# Patient Record
Sex: MALE | Race: WHITE | NOT HISPANIC OR LATINO | Employment: FULL TIME | URBAN - METROPOLITAN AREA
[De-identification: names, ages, dates, MRNs, and addresses within clinical notes are randomized per-mention and may not be internally consistent; named-entity substitution may affect disease eponyms.]

---

## 2021-07-25 ENCOUNTER — APPOINTMENT (OUTPATIENT)
Dept: RADIOLOGY | Facility: MEDICAL CENTER | Age: 36
End: 2021-07-25
Attending: EMERGENCY MEDICINE
Payer: COMMERCIAL

## 2021-07-25 ENCOUNTER — HOSPITAL ENCOUNTER (EMERGENCY)
Facility: MEDICAL CENTER | Age: 36
End: 2021-07-25
Attending: EMERGENCY MEDICINE
Payer: COMMERCIAL

## 2021-07-25 VITALS
RESPIRATION RATE: 17 BRPM | HEART RATE: 72 BPM | HEIGHT: 70 IN | BODY MASS INDEX: 23.67 KG/M2 | DIASTOLIC BLOOD PRESSURE: 77 MMHG | SYSTOLIC BLOOD PRESSURE: 110 MMHG | OXYGEN SATURATION: 97 % | TEMPERATURE: 98.3 F | WEIGHT: 165.34 LBS

## 2021-07-25 DIAGNOSIS — M25.512 CHRONIC LEFT SHOULDER PAIN: ICD-10-CM

## 2021-07-25 DIAGNOSIS — G89.29 CHRONIC LEFT SHOULDER PAIN: ICD-10-CM

## 2021-07-25 DIAGNOSIS — R07.89 CHEST WALL PAIN: ICD-10-CM

## 2021-07-25 LAB
ALBUMIN SERPL BCP-MCNC: 4.3 G/DL (ref 3.2–4.9)
ALBUMIN/GLOB SERPL: 2 G/DL
ALP SERPL-CCNC: 49 U/L (ref 30–99)
ALT SERPL-CCNC: 23 U/L (ref 2–50)
ANION GAP SERPL CALC-SCNC: 9 MMOL/L (ref 7–16)
AST SERPL-CCNC: 20 U/L (ref 12–45)
BASOPHILS # BLD AUTO: 0.6 % (ref 0–1.8)
BASOPHILS # BLD: 0.05 K/UL (ref 0–0.12)
BILIRUB SERPL-MCNC: 0.3 MG/DL (ref 0.1–1.5)
BUN SERPL-MCNC: 15 MG/DL (ref 8–22)
CALCIUM SERPL-MCNC: 8.6 MG/DL (ref 8.5–10.5)
CHLORIDE SERPL-SCNC: 108 MMOL/L (ref 96–112)
CO2 SERPL-SCNC: 23 MMOL/L (ref 20–33)
CREAT SERPL-MCNC: 0.89 MG/DL (ref 0.5–1.4)
D DIMER PPP IA.FEU-MCNC: <0.27 UG/ML (FEU) (ref 0–0.5)
EKG IMPRESSION: NORMAL
EOSINOPHIL # BLD AUTO: 0.27 K/UL (ref 0–0.51)
EOSINOPHIL NFR BLD: 3.3 % (ref 0–6.9)
ERYTHROCYTE [DISTWIDTH] IN BLOOD BY AUTOMATED COUNT: 42.1 FL (ref 35.9–50)
GLOBULIN SER CALC-MCNC: 2.1 G/DL (ref 1.9–3.5)
GLUCOSE SERPL-MCNC: 103 MG/DL (ref 65–99)
HCT VFR BLD AUTO: 42.1 % (ref 42–52)
HGB BLD-MCNC: 14.4 G/DL (ref 14–18)
IMM GRANULOCYTES # BLD AUTO: 0.05 K/UL (ref 0–0.11)
IMM GRANULOCYTES NFR BLD AUTO: 0.6 % (ref 0–0.9)
LYMPHOCYTES # BLD AUTO: 1.41 K/UL (ref 1–4.8)
LYMPHOCYTES NFR BLD: 17.5 % (ref 22–41)
MCH RBC QN AUTO: 31.7 PG (ref 27–33)
MCHC RBC AUTO-ENTMCNC: 34.2 G/DL (ref 33.7–35.3)
MCV RBC AUTO: 92.7 FL (ref 81.4–97.8)
MONOCYTES # BLD AUTO: 0.63 K/UL (ref 0–0.85)
MONOCYTES NFR BLD AUTO: 7.8 % (ref 0–13.4)
NEUTROPHILS # BLD AUTO: 5.65 K/UL (ref 1.82–7.42)
NEUTROPHILS NFR BLD: 70.2 % (ref 44–72)
NRBC # BLD AUTO: 0 K/UL
NRBC BLD-RTO: 0 /100 WBC
PLATELET # BLD AUTO: 230 K/UL (ref 164–446)
PMV BLD AUTO: 8.8 FL (ref 9–12.9)
POTASSIUM SERPL-SCNC: 4.1 MMOL/L (ref 3.6–5.5)
PROT SERPL-MCNC: 6.4 G/DL (ref 6–8.2)
RBC # BLD AUTO: 4.54 M/UL (ref 4.7–6.1)
SODIUM SERPL-SCNC: 140 MMOL/L (ref 135–145)
TROPONIN T SERPL-MCNC: 11 NG/L (ref 6–19)
WBC # BLD AUTO: 8.1 K/UL (ref 4.8–10.8)

## 2021-07-25 PROCEDURE — 73030 X-RAY EXAM OF SHOULDER: CPT | Mod: LT

## 2021-07-25 PROCEDURE — 84484 ASSAY OF TROPONIN QUANT: CPT

## 2021-07-25 PROCEDURE — 96374 THER/PROPH/DIAG INJ IV PUSH: CPT

## 2021-07-25 PROCEDURE — 80053 COMPREHEN METABOLIC PANEL: CPT

## 2021-07-25 PROCEDURE — 700111 HCHG RX REV CODE 636 W/ 250 OVERRIDE (IP): Performed by: EMERGENCY MEDICINE

## 2021-07-25 PROCEDURE — 85379 FIBRIN DEGRADATION QUANT: CPT

## 2021-07-25 PROCEDURE — 93005 ELECTROCARDIOGRAM TRACING: CPT | Performed by: EMERGENCY MEDICINE

## 2021-07-25 PROCEDURE — 71045 X-RAY EXAM CHEST 1 VIEW: CPT

## 2021-07-25 PROCEDURE — 85025 COMPLETE CBC W/AUTO DIFF WBC: CPT

## 2021-07-25 PROCEDURE — 99284 EMERGENCY DEPT VISIT MOD MDM: CPT

## 2021-07-25 RX ORDER — KETOROLAC TROMETHAMINE 30 MG/ML
30 INJECTION, SOLUTION INTRAMUSCULAR; INTRAVENOUS ONCE
Status: COMPLETED | OUTPATIENT
Start: 2021-07-25 | End: 2021-07-25

## 2021-07-25 RX ORDER — METHYLPREDNISOLONE 4 MG/1
TABLET ORAL
Qty: 21 EACH | Refills: 0 | Status: SHIPPED | OUTPATIENT
Start: 2021-07-25 | End: 2021-08-10

## 2021-07-25 RX ADMIN — KETOROLAC TROMETHAMINE 30 MG: 30 INJECTION, SOLUTION INTRAMUSCULAR at 12:14

## 2021-07-25 ASSESSMENT — LIFESTYLE VARIABLES
TOTAL SCORE: 0
TOTAL SCORE: 0
HOW MANY TIMES IN THE PAST YEAR HAVE YOU HAD 5 OR MORE DRINKS IN A DAY: 0
EVER HAD A DRINK FIRST THING IN THE MORNING TO STEADY YOUR NERVES TO GET RID OF A HANGOVER: NO
HAVE PEOPLE ANNOYED YOU BY CRITICIZING YOUR DRINKING: NO
AVERAGE NUMBER OF DAYS PER WEEK YOU HAVE A DRINK CONTAINING ALCOHOL: 0
TOTAL SCORE: 0
CONSUMPTION TOTAL: NEGATIVE
EVER FELT BAD OR GUILTY ABOUT YOUR DRINKING: NO
HAVE YOU EVER FELT YOU SHOULD CUT DOWN ON YOUR DRINKING: NO
DO YOU DRINK ALCOHOL: NO
ON A TYPICAL DAY WHEN YOU DRINK ALCOHOL HOW MANY DRINKS DO YOU HAVE: 0

## 2021-07-25 ASSESSMENT — HEART SCORE
RISK FACTORS: NO KNOWN RISK FACTORS
ECG: NORMAL
AGE: <45
HISTORY: SLIGHTLY SUSPICIOUS
TROPONIN: LESS THAN OR EQUAL TO NORMAL LIMIT
HEART SCORE: 0

## 2021-07-25 NOTE — ED NOTES
Pt ambulated to green pod without distress, VSS on RA, gCS 15, denies medical hx. Pt states pain in L axilla X 2 months immediately after J+J covid vaccine in LUE, pain intermittent in intensity but worse today, pain is relieved by heat and position (better when elevated), pt denies any other chest pain or SOB. Pt states a numbness / tingling that radiates down LUE by position.

## 2021-07-25 NOTE — ED PROVIDER NOTES
ED Provider Note    Scribed for Paty Johnson M.D. by Varsha Brooks. 7/25/2021  10:44 AM    Primary care provider: Pcp Pt States None  Means of arrival: Walk in  History obtained from: patient  History limited by: None    CHIEF COMPLAINT  Chief Complaint   Patient presents with    Chest Wall Pain    Tingling       HPI  Maycol Lezama is a 36 y.o. male who presents to the Emergency Department with constant mild chest wall pain onset 6 weeks ago. He describes the pain as most prominent around the left axilla and shoulder with radiation down his back, and accompanied by numbness and tingling in his left arm. Per the patient, the pain appeared to start after he received the J&J vaccine, and has since persisted at around 2/10 pain. He states that he signed up to go to the gym as he initially attributed the pain to lack of fitness, and performed stretches and light weight lifting. He came in today as the pain worsened substantially around 3 AM from 2/10 pain to 8/10 sharp pain that was not relieved by anything. The patient denies palpitations, shortness of breath, nausea, vomiting, diarrhea, constipation, headache, double vision, fevers, chills, or rashes. Stretching and massages provided mild alleviation and moving forward and sudden movements exacerbate the pain.  He denies major medical history, taking daily medications, past surgeries, and history of pain or injury to his left arm. He vapes, used to smoke cigarettes, occasionally smokes marijuana, and has 2-3 beers a week.    REVIEW OF SYSTEMS  EYES: no vision changes  CARDIAC: no palpitations    PULMONARY: no shortness of breath  GI: no vomiting, nausea, diarrhea, or constipation.   Neuro: no headache  Musculoskeletal: left chest wall pain, most prominent around the left axilla and shoulder. Numbness and tingling in the left arm  Endocrine: no fevers or chills   SKIN: no rash    See history of present illness. All other systems are negative. C.    PAST MEDICAL  "HISTORY   has a past medical history of Eczema.    SURGICAL HISTORY  patient denies any surgical history    SOCIAL HISTORY  Social History     Tobacco Use    Smoking status: Current Every Day Smoker    Smokeless tobacco: Never Used   Substance Use Topics    Alcohol use: 2-3 beers a week    Drug use: Occasional marijuana use (smokes      Social History     Substance and Sexual Activity   Drug Use Occasional marijuana use       FAMILY HISTORY  History reviewed. No pertinent family history.    CURRENT MEDICATIONS  Home Medications       Reviewed by Chely Alexander R.N. (Registered Nurse) on 07/25/21 at 1017  Med List Status: Partial     Medication Last Dose Status   diphenhydrAMINE (BENADRYL) 25 MG TABS  Active   fluocinonide (LIDEX) 0.05 % CREA  Active   loratadine (CLARITIN) 10 MG TABS  Active   methylPREDNISolone (MEDROL DOSEPACK) 4 MG tablet  Active   methylPREDNISolone (MEDROL DOSEPACK) 4 MG tablet  Active   sulfamethoxazole-trimethoprim (BACTRIM DS) 800-160 MG tablet  Active   tobramycin-dexamethasone (TOBRADEX) 0.3-0.1 % SUSP  Active   triamcinolone acetonide (KENALOG) 0.1 % OINT  Active                    ALLERGIES  No Known Allergies    PHYSICAL EXAM  VITAL SIGNS: /78   Pulse 75   Temp 36.8 °C (98.3 °F) (Temporal)   Resp 16   Ht 1.778 m (5' 10\")   Wt 75 kg (165 lb 5.5 oz)   SpO2 96%   BMI 23.72 kg/m²     Constitutional: Well developed, Well nourished, No acute distress, Non-toxic appearance.   HEENT: Normocephalic, Atraumatic,  external ears normal, pharynx pink,  Mucous  Membranes moist, No rhinorrhea or mucosal edema  Eyes: PERRL, EOMI, Conjunctiva normal, No discharge.   Neck: Normal range of motion, No tenderness, Supple, No stridor.   Lymphatic: No lymphadenopathy    Cardiovascular: Regular Rate and Rhythm, No murmurs,  rubs, or gallops.   Thorax & Lungs: Lungs clear to auscultation bilaterally, No respiratory distress, No wheezes, rhales or rhonchi, No chest wall tenderness.   Abdomen: " Bowel sounds normal, Soft, non tender, non distended,  No pulsatile masses., no rebound guarding or peritoneal signs.   Skin: Warm, Dry, No erythema, No rash,   Back:  No CVA tenderness,  No spinal tenderness, bony crepitance, step offs, or instability.   Neurologic: Alert & oriented clear speech no focal deficits  Extremities: Equal, intact distal pulses, No cyanosis, clubbing or edema. 5/5 strength in bilateral upper extremities  Musculoskeletal: Good range of motion in all major joints. Tenderness in the left scapular area. No major deformities noted.     DIAGNOSTIC STUDIES / PROCEDURES    LABS  Results for orders placed or performed during the hospital encounter of 07/25/21   CBC with Differential   Result Value Ref Range    WBC 8.1 4.8 - 10.8 K/uL    RBC 4.54 (L) 4.70 - 6.10 M/uL    Hemoglobin 14.4 14.0 - 18.0 g/dL    Hematocrit 42.1 42.0 - 52.0 %    MCV 92.7 81.4 - 97.8 fL    MCH 31.7 27.0 - 33.0 pg    MCHC 34.2 33.7 - 35.3 g/dL    RDW 42.1 35.9 - 50.0 fL    Platelet Count 230 164 - 446 K/uL    MPV 8.8 (L) 9.0 - 12.9 fL    Neutrophils-Polys 70.20 44.00 - 72.00 %    Lymphocytes 17.50 (L) 22.00 - 41.00 %    Monocytes 7.80 0.00 - 13.40 %    Eosinophils 3.30 0.00 - 6.90 %    Basophils 0.60 0.00 - 1.80 %    Immature Granulocytes 0.60 0.00 - 0.90 %    Nucleated RBC 0.00 /100 WBC    Neutrophils (Absolute) 5.65 1.82 - 7.42 K/uL    Lymphs (Absolute) 1.41 1.00 - 4.80 K/uL    Monos (Absolute) 0.63 0.00 - 0.85 K/uL    Eos (Absolute) 0.27 0.00 - 0.51 K/uL    Baso (Absolute) 0.05 0.00 - 0.12 K/uL    Immature Granulocytes (abs) 0.05 0.00 - 0.11 K/uL    NRBC (Absolute) 0.00 K/uL   Complete Metabolic Panel (CMP)   Result Value Ref Range    Sodium 140 135 - 145 mmol/L    Potassium 4.1 3.6 - 5.5 mmol/L    Chloride 108 96 - 112 mmol/L    Co2 23 20 - 33 mmol/L    Anion Gap 9.0 7.0 - 16.0    Glucose 103 (H) 65 - 99 mg/dL    Bun 15 8 - 22 mg/dL    Creatinine 0.89 0.50 - 1.40 mg/dL    Calcium 8.6 8.5 - 10.5 mg/dL    AST(SGOT) 20 12 -  45 U/L    ALT(SGPT) 23 2 - 50 U/L    Alkaline Phosphatase 49 30 - 99 U/L    Total Bilirubin 0.3 0.1 - 1.5 mg/dL    Albumin 4.3 3.2 - 4.9 g/dL    Total Protein 6.4 6.0 - 8.2 g/dL    Globulin 2.1 1.9 - 3.5 g/dL    A-G Ratio 2.0 g/dL   Troponin STAT   Result Value Ref Range    Troponin T 11 6 - 19 ng/L   D-Dimer   Result Value Ref Range    D-Dimer Screen <0.27 0.00 - 0.50 ug/mL (FEU)   ESTIMATED GFR   Result Value Ref Range    GFR If African American >60 >60 mL/min/1.73 m 2    GFR If Non African American >60 >60 mL/min/1.73 m 2   EKG   Result Value Ref Range    Report       [facility] Emergency Dept.    Test Date:  2021  Pt Name:    KAVYA LLAMAS             Department: ER  MRN:        [id number]                      Room:       [facility]  Gender:     Male                         Technician: [id number]  :        1985                   Requested By:BIMAL GABRIEL  Order #:    [id number]                    Reading MD: BIMAL GABRIEL MD    Measurements  Intervals                                Axis  Rate:       63                           P:          23  NV:         172                          QRS:        60  QRSD:       106                          T:          40  QT:         403  QTc:        413    Interpretive Statements  Sinus rhythm  No previous ECG available for comparison  Electronically Signed On 2021 12:24:36 PDT by BIMAL GABRIEL MD       All labs reviewed by me.    EKG  12 lead EKG; Interpreted by emergency department physician as above    RADIOLOGY  DX-SHOULDER 2+ LEFT   Final Result      No acute fracture or significant arthropathy.      DX-CHEST-PORTABLE (1 VIEW)   Final Result      No evidence of acute cardiopulmonary process.        The radiologist's interpretation of all radiological studies have been reviewed by me.    COURSE & MEDICAL DECISION MAKING  Nursing notes, VS, PMSFHx reviewed in chart.    10:44 AM Patient seen and examined at bedside. Ordered DX-shoulder, DX-chest,  Troponin, D-dimer, CBC w/diff, CMP, and an EKG to evaluate his symptoms. The differential diagnoses include but are not limited to: cardiac chest pain, pneumonia, pleurisy, shoulder strain, pinched nerve    11:10 AM Patient treated with Toradol 30 mg    12:21 PM Reviewed the patient's labs and imaging; troponin is 11, d-dimer was negative, and DX-shoulder was not concerning for bony abnormalities. He does not require a second troponin given his pain has been present for multiple weeks.     12:22 PM Patient re-evaluated at bedside. Discussed the patient's condition and treatment plan, including my plans for discharge with medrol dospak and referral to orthopedics. Patient's labs and radiology results discussed. Advised applying ice to his shoulder and limiting range of motion until he can be seen by ortho. Gave further discharge instructions and return precautions.The patient understood and is comfortable with discharge. At this time, the patient was given the opportunity to ask questions.       Heart Score is: 0    The patient will return for new or worsening symptoms and is stable at the time of discharge.    The patient is referred to a primary physician for blood pressure management, diabetic screening, and for all other preventative health concerns.    DISPOSITION:  Patient will be discharged home in stable condition.    FOLLOW UP:  Tejinder Chew M.D.  [address] [facility] [address]  [phone]    Call in 1 day  to establish care, for recheck      OUTPATIENT MEDICATIONS:  Discharge Medication List as of 7/25/2021 12:53 PM        START taking these medications    Details   methylPREDNISolone (MEDROL DOSEPAK) 4 MG Tablet Therapy Pack Take as directed, Disp-21 Each, R-0, Normal             FINAL IMPRESSION  1. Chronic left shoulder pain    2. Chest wall pain          I, Varsha Ferguson), am scribing for, and in the presence of, Paty Johnson M.D..    Electronically signed by: Varsha Ferguson),  7/25/2021    IPaty M.D. personally performed the services described in this documentation, as scribed by Varsha Brooks in my presence, and it is both accurate and complete.    C    The note accurately reflects work and decisions made by me.  Paty Johnson M.D.  7/25/2021  3:57 PM

## 2021-07-25 NOTE — ED TRIAGE NOTES
Pt ambulatory to triage c/o pain to left armpit chest wall area since getting J&J covid vaccine 4 weeks ago. Pt states pain has been at a constant 2/10 but today increased to 8/10. Pain is relieved by heat or position. Nad. vss

## 2021-07-27 ENCOUNTER — OFFICE VISIT (OUTPATIENT)
Dept: URGENT CARE | Facility: PHYSICIAN GROUP | Age: 36
End: 2021-07-27
Payer: COMMERCIAL

## 2021-07-27 VITALS
DIASTOLIC BLOOD PRESSURE: 78 MMHG | BODY MASS INDEX: 23.62 KG/M2 | SYSTOLIC BLOOD PRESSURE: 108 MMHG | TEMPERATURE: 98.4 F | OXYGEN SATURATION: 99 % | RESPIRATION RATE: 16 BRPM | HEIGHT: 70 IN | WEIGHT: 165 LBS | HEART RATE: 74 BPM

## 2021-07-27 DIAGNOSIS — M62.838 TRAPEZIUS MUSCLE SPASM: ICD-10-CM

## 2021-07-27 DIAGNOSIS — M25.512 ACUTE PAIN OF LEFT SHOULDER: ICD-10-CM

## 2021-07-27 PROCEDURE — 99203 OFFICE O/P NEW LOW 30 MIN: CPT | Performed by: PHYSICIAN ASSISTANT

## 2021-07-27 RX ORDER — CYCLOBENZAPRINE HCL 5 MG
5-10 TABLET ORAL NIGHTLY PRN
Qty: 20 TABLET | Refills: 0 | Status: SHIPPED | OUTPATIENT
Start: 2021-07-27 | End: 2021-08-10

## 2021-07-27 ASSESSMENT — ENCOUNTER SYMPTOMS
SORE THROAT: 0
HEADACHES: 0
FEVER: 0
VOMITING: 0
SHORTNESS OF BREATH: 0
COUGH: 0
EYE DISCHARGE: 0
NAUSEA: 0
EYE REDNESS: 0

## 2021-07-27 ASSESSMENT — FIBROSIS 4 INDEX: FIB4 SCORE: 0.62

## 2021-07-27 NOTE — PROGRESS NOTES
"Subjective:      Antwan Lezama is a 36 y.o. male who presents with Arm Pain (left arm and shoulder pain and numbness  x 2 months )          This is a new problem.  The patient presents to clinic complaining of left shoulder and left arm pain x2.5 months.  The patient states he developed pain to his left upper arm and shoulder approximately 2.5 months ago after receiving the J&J COVID-19 vaccine.  The patient states he was treating his shoulder and arm pain conservatively with over-the-counter ibuprofen, ice, heat, and Biofreeze.  The patient states he would also limit the pain to his left shoulder and arm by only sleeping on his right side.  The patient states on Sunday, 7/25/2021, he woke up with increasing pain to the left shoulder and left arm with associated tingling of the left upper extremity.  The patient reports associated radiation of pain from the left shoulder to the left upper extremity.  The patient reports no numbness or weakness.  He also reports no recent injury or trauma to the left shoulder/arm.  He denies swelling, bruising, or redness to the affected area.  The patient states he was seen and evaluated in the ED on Sunday for his increasing left shoulder pain with associated tingling.  The patient states the ED provider believes his symptoms are related to a \"pinched nerve\".  The patient states he was prescribed a Medrol Dosepak.  The patient reports continued pain to the left shoulder.  The patient states he is scheduled to follow-up with orthopedics tomorrow.  The patient is continue to take over-the-counter ibuprofen, as well as apply ice, heat, and Biofreeze to the affected area.  The patient states he has also tried KT tape.  The patient reports no improvement of his left shoulder pain with the above remedies.    Shoulder Pain  This is a new problem. Episode onset: x 2 months ago. The problem occurs constantly. The problem has been unchanged. Pertinent negatives include no chest pain, " congestion, coughing, fever, headaches, nausea, rash, sore throat or vomiting. He has tried ice, heat and NSAIDs for the symptoms.     PMH:  has a past medical history of Eczema.  MEDS:   Current Outpatient Medications:   •  methylPREDNISolone (MEDROL DOSEPAK) 4 MG Tablet Therapy Pack, Take as directed, Disp: 21 Each, Rfl: 0  •  methylPREDNISolone (MEDROL DOSEPACK) 4 MG tablet, Follow the directions on the pack., Disp: 1 Kit, Rfl: 0  •  methylPREDNISolone (MEDROL DOSEPACK) 4 MG tablet, Follow the directions on the pack., Disp: 1 Kit, Rfl: 0  •  sulfamethoxazole-trimethoprim (BACTRIM DS) 800-160 MG tablet, Take 1 Tab by mouth 2 times a day., Disp: 20 Tab, Rfl: 0  •  loratadine (CLARITIN) 10 MG TABS, Take 10 mg by mouth every day., Disp: , Rfl:   •  diphenhydrAMINE (BENADRYL) 25 MG TABS, Take 25 mg by mouth every 6 hours as needed., Disp: , Rfl:   •  tobramycin-dexamethasone (TOBRADEX) 0.3-0.1 % SUSP, Place 1 Drop in both eyes every 4 hours while awake., Disp: 10 mL, Rfl: 1  •  fluocinonide (LIDEX) 0.05 % CREA, Apply 1 Application to affected area(s) 2 times a day., Disp: 1 Tube, Rfl: 5  •  triamcinolone acetonide (KENALOG) 0.1 % OINT, Apply to affected area twice daily, Disp: 1 Tube, Rfl: 1  ALLERGIES: No Known Allergies  SURGHX: No past surgical history on file.  SOCHX:  reports that he has quit smoking. He has never used smokeless tobacco. He reports previous alcohol use. He reports current drug use. Drug: Marijuana.  FH: Family history was reviewed, no pertinent findings to report    Review of Systems   Constitutional: Negative for fever.   HENT: Negative for congestion, ear pain and sore throat.    Eyes: Negative for discharge and redness.   Respiratory: Negative for cough and shortness of breath.    Cardiovascular: Negative for chest pain and leg swelling.   Gastrointestinal: Negative for nausea and vomiting.   Musculoskeletal: Positive for joint pain (left shoulder).   Skin: Negative for rash.   Neurological:  "Negative for headaches.          Objective:     /78 (BP Location: Right arm, Patient Position: Sitting, BP Cuff Size: Adult)   Pulse 74   Temp 36.9 °C (98.4 °F) (Temporal)   Resp 16   Ht 1.778 m (5' 10\")   Wt 74.8 kg (165 lb)   SpO2 99%   BMI 23.68 kg/m²      Physical Exam  Constitutional:       General: He is not in acute distress.     Appearance: Normal appearance. He is well-developed. He is not ill-appearing.   HENT:      Head: Normocephalic and atraumatic.      Right Ear: External ear normal.      Left Ear: External ear normal.      Nose: Nose normal.   Eyes:      Conjunctiva/sclera: Conjunctivae normal.   Cardiovascular:      Rate and Rhythm: Normal rate.   Pulmonary:      Effort: Pulmonary effort is normal.   Musculoskeletal:      Cervical back: Normal range of motion and neck supple.      Comments:   Left Shoulder:  Tenderness to the posterior aspect of the left shoulder overlying the trapezius muscle.  No tenderness to the anterior aspect of the left shoulder.  No tenderness overlying the clavicle.  No tenderness to the proximal humerus.  No swelling.  No ecchymosis.  No overlying erythema.  No increased warmth.  The patient has KT tape in place.  ROM intact -patient states full active range of motion of the left shoulder  Neurovascular intact distally  Radial pulse 2+  Strength 5/5 -equal bilateral upper extremities   strength 5/5  Intrinsic muscles intact   Skin:     General: Skin is warm and dry.   Neurological:      Mental Status: He is alert and oriented to person, place, and time.           Progress:  Reviewed the patient's recent ED visit on 7/25/2021, including imaging, laboratory testing, and EKG.  The patient's left shoulder x-ray showed no acute fracture or significant arthropathy.     Assessment/Plan:          1. Acute pain of left shoulder    2. Trapezius muscle spasm  - cyclobenzaprine (FLEXERIL) 5 mg tablet; Take 1-2 Tablets by mouth at bedtime as needed.  Dispense: 20 " tablet; Refill: 0  --. Advised the patient to only take this medication at night, as it may cause drowsiness. Instructed the patient not to take the medication while at work, driving, or operating machinery.  Instructed the patient not to drink alcohol while taking this medication.    Differential diagnoses, supportive care, and indications for immediate follow-up discussed with patient.   Instructed to return to clinic or nearest emergency department for any change in condition, further concerns, or worsening of symptoms.    Continue Medrol Dosepak as prescribed  OTC NSAIDs for pain/discomfort   Apply ice and or heat to the affected area for symptomatic relief   follow-up with Orthopedics as scheduled  Follow-up with PCP as needed  Return to clinic or go tot he ED if symptoms worsen or fail to improve, or if the patient should develop worsening/increasing pain/tenderness, swelling, bruising, redness or warmth to the affected area, decreased ROM, numbness, tingling or weakness, difficulty walking, fever/chills, and/or any concerning symptoms.     Discussed plan with the patient, and he agrees to the above.    I personally reviewed prior external notes and test results pertinent to today's visit.  I have independently reviewed and interpreted all diagnostics ordered during this urgent care visit.     Time spent evaluating this patient was at least 30 minutes and includes preparing for visit, obtaining history, exam and evaluation, ordering labs/tests/procedures/medications, independent interpretation, and counseling/education.    Please note that this dictation was created using voice recognition software. I have made every reasonable attempt to correct obvious errors, but I expect that there may be errors of grammar and possibly content that I did not discover before finalizing the note.       This note was electronically signed by Ruchi Calzada PA-C

## 2021-07-27 NOTE — LETTER
July 27, 2021         Patient: Maycol Lezama   YOB: 1985   Date of Visit: 7/27/2021           To Whom it May Concern:    Maycol Lezama was seen in my clinic on 7/27/2021. Please excuse him from work 7/27 and 7/28.      If you have any questions or concerns, please don't hesitate to call.        Sincerely,           Ruchi Calzada P.A.-C.  Electronically Signed

## 2021-08-10 ENCOUNTER — TELEMEDICINE (OUTPATIENT)
Dept: MEDICAL GROUP | Facility: IMAGING CENTER | Age: 36
End: 2021-08-10
Payer: COMMERCIAL

## 2021-08-10 VITALS — HEIGHT: 70 IN | BODY MASS INDEX: 23.62 KG/M2 | WEIGHT: 165 LBS | HEART RATE: 103 BPM

## 2021-08-10 DIAGNOSIS — Z76.89 ENCOUNTER TO ESTABLISH CARE: ICD-10-CM

## 2021-08-10 DIAGNOSIS — M25.512 CHRONIC LEFT SHOULDER PAIN: ICD-10-CM

## 2021-08-10 DIAGNOSIS — G62.9 NEUROPATHY: ICD-10-CM

## 2021-08-10 DIAGNOSIS — G89.29 CHRONIC LEFT SHOULDER PAIN: ICD-10-CM

## 2021-08-10 PROCEDURE — 99214 OFFICE O/P EST MOD 30 MIN: CPT | Mod: 95 | Performed by: PHYSICIAN ASSISTANT

## 2021-08-10 RX ORDER — MELOXICAM 15 MG/1
15 TABLET ORAL DAILY
Qty: 30 TABLET | Refills: 1 | Status: SHIPPED | OUTPATIENT
Start: 2021-08-10

## 2021-08-10 ASSESSMENT — PAIN SCALES - GENERAL: PAINLEVEL: 4=SLIGHT-MODERATE PAIN

## 2021-08-10 ASSESSMENT — PATIENT HEALTH QUESTIONNAIRE - PHQ9: CLINICAL INTERPRETATION OF PHQ2 SCORE: 0

## 2021-08-10 ASSESSMENT — FIBROSIS 4 INDEX: FIB4 SCORE: 0.62

## 2021-08-10 NOTE — PATIENT INSTRUCTIONS
Follow-up in 2 weeks for recheck in office    Once resulted, your lab/test/imaging results will show up automatically in your MyChart. Please wait for my interpretation and recommendations prior to viewing your results to avoid any unnecessary confusion or misinterpretation. I will address all of the lab values that I interpret as abnormal and message you accordingly on your MyChart. I will always send you a message on your labs even if they are normal. If you do not hear back from me within 5 business days after getting your labs drawn, then please send me a message on [facility] so I can obtain your labs (especially if you went to an outside lab - [facility], [facility], etc). If you have any additional questions or concerns beyond my interpretation of your results, please make an appointment with me to discuss in further detail.    Please only use the [employer] messaging system for questions regarding your most recent appointment or if advised to use otherwise (glucose or blood pressure reporting). If you have any new problems or concerns, you must make an appointment to discuss. This includes any referral requests.     Thank you,    Roxanna Aaron PA-C ([place])  Physician Assistant Certified  [facility]

## 2021-08-10 NOTE — PROGRESS NOTES
Virtual Visit: New Patient   This visit was conducted via Zoom using secure and encrypted videoconferencing technology. The patient was in a private location in the state of [place].    The patient's identity was confirmed and verbal consent was obtained for this virtual visit.    Subjective:     CC:   Chief Complaint   Patient presents with   • Establish Care   • ED Follow-Up     pinched nerve in left shoulder, pain radiates from neck to hands       Maycol Lezama is a 36 y.o. male presenting to establish care and to discuss the evaluation and management of:    Chronic left shoulder pain  Pt has been c/o left shoulder pain that has been persistent x 8 weeks. Pt notes that his pain started after getting the J&J vaccine. He has been getting severe pain and numbness and tingling down his whole arm, into his armpit, and up his neck. He has gone to the ER and urgent care and has been given steroids and toradol without relief. He has tried muscle relaxants without relief. He was referred to a neurologist for EMG, but they are booked out until end of October. No weakness.      Depression Screening    Little interest or pleasure in doing things?  0 - not at all  Feeling down, depressed , or hopeless? 0 - not at all  Patient Health Questionnaire Score: 0      ROS  See HPI  Constitutional: Negative for fever, chills and malaise/fatigue.   Respiratory: Negative for cough and shortness of breath.    Cardiovascular: Negative for leg swelling.   Gastrointestinal: Negative for nausea, vomiting, abdominal pain and diarrhea.   Genitourinary: Negative for dysuria and hematuria.   Skin: Negative for rash.   Neurological: Negative for dizziness, focal weakness and headaches. .   Psychiatric/Behavioral: Negative for depression.  The patient is not nervous/anxious.      Dietary: no dietary restrictions  Exercise: weight lifting, running several days a week, 15,000 steps a day  No Known Allergies    Current medicines (including changes  "today)  Current Outpatient Medications   Medication Sig Dispense Refill   • IBUPROFEN PO Take  by mouth.     • Lido-Capsaicin-Men-Methyl Sal (MEDI-PATCH-LIDOCAINE EX) Apply  topically.     • Menthol, Topical Analgesic, (BIOFREEZE EX) Apply  topically.     • meloxicam (MOBIC) 15 MG tablet Take 1 tablet by mouth every day. 30 tablet 1     No current facility-administered medications for this visit.       He  has a past medical history of Eczema.  He  has no past surgical history on file.      Family History   Problem Relation Age of Onset   • Diabetes Mother         has had her whole life according to pt   • Cancer Neg Hx    • Lupus Neg Hx    • Mult Sclerosis Neg Hx      Family Status   Relation Name Status   • Mo  (Not Specified)       Patient Active Problem List    Diagnosis Date Noted   • Chronic left shoulder pain 08/10/2021        Objective:   Pulse (!) 103 Comment: pt watch reported  Ht 1.778 m (5' 10\")   Wt 74.8 kg (165 lb)   BMI 23.68 kg/m²   RR 12    Physical Exam:  Constitutional: Alert, no distress, well-groomed.  Skin: No rashes in visible areas.  Eye: Round. Conjunctiva clear, lids normal. No icterus.   ENMT: Lips pink without lesions, good dentition, moist mucous membranes. Phonation normal.  Neck: No masses, no thyromegaly. Moves freely without pain.  Respiratory: Unlabored respiratory effort, no cough or audible wheeze  Psych: Alert and oriented x3, normal affect and mood.       Assessment and Plan:   The following treatment plan was discussed:     1. Encounter to establish care  Pleasant 36-year-old male establishing care via Zoom.  History reviewed.  Up-to-date on vaccinations.  Previous labs and chart notes reviewed with patient.    2. Neuropathy  Possible brachial plexopathy versus cervical origin.  Will refer to PMR for EMG.  Rule out metabolic etiology.  Glucose within normal limits.  Change ibuprofen to meloxicam 1 a day for compliance.  Discussed Neurontin -patient would like to hold off.  " Consider changing to a new muscle relaxant such as baclofen -patient would like to hold off.  Discussed acupuncture.  Patient is interested and will contact my office.  - meloxicam (MOBIC) 15 MG tablet; Take 1 tablet by mouth every day.  Dispense: 30 tablet; Refill: 1  - REFERRAL TO OUTPATIENT INTERVENTIONAL PAIN CLINIC  - TSH WITH REFLEX TO FT4; Future  - Sed Rate; Future  - VITAMIN B12; Future    3. Chronic left shoulder pain  Related to #2  - meloxicam (MOBIC) 15 MG tablet; Take 1 tablet by mouth every day.  Dispense: 30 tablet; Refill: 1      Follow-up: Return in about 2 weeks (around 8/24/2021) for Follow-up.         Roxanna Aaron PA-C ([place])  Physician Assistant Certified  [facility]    Please note that this dictation was created using voice recognition software. I have made every reasonable attempt to correct obvious errors, but I expect that there are errors of grammar and possibly content that I did not discover before finalizing the note.

## 2021-08-10 NOTE — ASSESSMENT & PLAN NOTE
Pt has been c/o left shoulder pain that has been persistent x 8 weeks. Pt notes that his pain started after getting the J&J vaccine. He has been getting severe pain and numbness and tingling down his whole arm, into his armpit, and up his neck. He has gone to the ER and urgent care and has been given steroids and toradol without relief. He has tried muscle relaxants without relief. He was referred to a neurologist for EMG, but they are booked out until end of October. No weakness.